# Patient Record
Sex: MALE | Race: WHITE | Employment: OTHER | URBAN - NONMETROPOLITAN AREA
[De-identification: names, ages, dates, MRNs, and addresses within clinical notes are randomized per-mention and may not be internally consistent; named-entity substitution may affect disease eponyms.]

---

## 2020-05-18 ENCOUNTER — HOSPITAL ENCOUNTER (EMERGENCY)
Age: 40
Discharge: HOME | End: 2020-05-19
Payer: COMMERCIAL

## 2020-05-18 VITALS
DIASTOLIC BLOOD PRESSURE: 114 MMHG | BODY MASS INDEX: 35.94 KG/M2 | HEIGHT: 74 IN | WEIGHT: 280 LBS | OXYGEN SATURATION: 99 % | SYSTOLIC BLOOD PRESSURE: 163 MMHG | RESPIRATION RATE: 16 BRPM | TEMPERATURE: 98.5 F | HEART RATE: 80 BPM

## 2020-05-18 PROCEDURE — 99282 EMERGENCY DEPT VISIT SF MDM: CPT

## 2020-05-18 RX ORDER — LORATADINE 10 MG/1
10 TABLET ORAL DAILY
COMMUNITY

## 2020-05-18 RX ORDER — AMOXICILLIN AND CLAVULANATE POTASSIUM 875; 125 MG/1; MG/1
1 TABLET, FILM COATED ORAL 2 TIMES DAILY
COMMUNITY

## 2020-05-18 ASSESSMENT — PAIN DESCRIPTION - LOCATION: LOCATION: MOUTH

## 2020-05-18 ASSESSMENT — PAIN DESCRIPTION - PAIN TYPE: TYPE: ACUTE PAIN

## 2020-05-18 ASSESSMENT — PAIN DESCRIPTION - ORIENTATION: ORIENTATION: RIGHT;LOWER

## 2020-05-18 ASSESSMENT — PAIN SCALES - GENERAL: PAINLEVEL_OUTOF10: 6

## 2020-05-18 ASSESSMENT — PAIN DESCRIPTION - FREQUENCY: FREQUENCY: CONTINUOUS

## 2020-05-19 NOTE — ED PROVIDER NOTES
[address] [facility]        Pt Name: Елена Solomon  MRN: [id number]  [place] 1980  Date of evaluation: 5/18/2020  Provider: Noah Snyder PA-C  PCP: No primary care provider on file. Evaluation by MONSERRAT. My supervising physician was available for consultation. Javi Coburn      CHIEF COMPLAINT       Chief Complaint   Patient presents with    Facial Swelling     pt has had dental pain for past week and swelling started last night small amount went to clinic started antibiotic today this evening rt side jaw area 3 x compared to this morning. No fevers        HISTORY OF PRESENT ILLNESS   (Location, Timing/Onset, Context/Setting, Quality, Duration, Modifying Factors, Severity, Associated Signs and Symptoms)  Note limiting factors. Елена Solomon is a 46 y.o. male patient presenting with complaint swelling right face. States began this morning about 9 AM.  He states about a week ago a tooth broke on the lower right. The swelling progressed and he went to see a clinic service this morning at approximate 11 AM.  He was prescribed Augmentin. He had initial dose shortly thereafter and repeated dose at 5 PM this evening. Throughout today's had ibuprofen. Last dose ibuprofen 800 mg at 7 PM.  Current time 11:30 PM.  He indicates no difficulty in breathing, speaking or opening mouth widely. He does swallow his secretions. He does express concern because the swelling is increased despite the 2 doses of antibiotic. Denies chest pain or shortness of breath. Denies fevers or chills. He is known to have bad teeth on the lower right. In fact, the patient does indicate to me that he was told he would need a couple of teeth pulled on the right a couple years ago. Nursing Notes were all reviewed and agreed with or any disagreements were addressed in the HPI.     REVIEW OF SYSTEMS    (2-9 systems for level 4, 10 or more for level 5)     Review of Systems    Positives and Pertinent negatives as per HPI. Except as noted above in the ROS, all other systems were reviewed and negative. PAST MEDICAL HISTORY   History reviewed. No pertinent past medical history. SURGICAL HISTORY     Past Surgical History:   Procedure Laterality Date    KNEE SURGERY           CURRENTMEDICATIONS       Discharge Medication List as of 5/18/2020 11:40 PM      CONTINUE these medications which have NOT CHANGED    Details   amoxicillin-clavulanate (AUGMENTIN) 875-125 MG per tablet Take 1 tablet by mouth 2 times dailyHistorical Med      loratadine (CLARITIN) 10 MG tablet Take 10 mg by mouth dailyHistorical Med               ALLERGIES     Pertussis vaccine    FAMILYHISTORY     History reviewed. No pertinent family history. SOCIAL HISTORY       Social History     Tobacco Use    Smoking status: Never Smoker    Smokeless tobacco: Current User     Types: Chew   Substance Use Topics    Alcohol use: Yes     Alcohol/week: 2.0 standard drinks     Types: 2 Cans of beer per week    Drug use: Not Currently       SCREENINGS    Luis Coma Scale  Eye Opening: Spontaneous  Best Verbal Response: Oriented  Best Motor Response: Obeys commands  Luis Coma Scale Score: 15        PHYSICAL EXAM    (up to 7 for level 4, 8 or more for level 5)     ED Triage Vitals [05/18/20 2313]   BP Temp Temp Source Pulse Resp SpO2 Height Weight   (!) 163/114 98.5 °F (36.9 °C) Oral 80 16 99 % 6' 2\" (1.88 m) 280 lb (127 kg)       Physical Exam  Vitals signs and nursing note reviewed. Constitutional:       Appearance: Normal appearance. He is well-developed and normal weight. HENT:      Head: Normocephalic and atraumatic. Right Ear: External ear normal.      Left Ear: External ear normal.      Mouth/Throat:      Comments: Patient does have dental decay and tooth loss regarding tooth #31 and tooth #29. I do find right lower facial swelling. There is no floor of mouth involvement.   Tongue is at